# Patient Record
Sex: MALE | Race: WHITE | NOT HISPANIC OR LATINO
[De-identification: names, ages, dates, MRNs, and addresses within clinical notes are randomized per-mention and may not be internally consistent; named-entity substitution may affect disease eponyms.]

---

## 2017-02-02 ENCOUNTER — APPOINTMENT (OUTPATIENT)
Dept: VASCULAR SURGERY | Facility: CLINIC | Age: 57
End: 2017-02-02

## 2022-01-14 ENCOUNTER — TRANSCRIPTION ENCOUNTER (OUTPATIENT)
Age: 62
End: 2022-01-14

## 2022-01-30 ENCOUNTER — EMERGENCY (EMERGENCY)
Facility: HOSPITAL | Age: 62
LOS: 1 days | Discharge: HOME | End: 2022-01-30
Attending: EMERGENCY MEDICINE | Admitting: EMERGENCY MEDICINE
Payer: COMMERCIAL

## 2022-01-30 VITALS
HEART RATE: 83 BPM | OXYGEN SATURATION: 94 % | RESPIRATION RATE: 18 BRPM | HEIGHT: 72 IN | DIASTOLIC BLOOD PRESSURE: 73 MMHG | TEMPERATURE: 98 F | SYSTOLIC BLOOD PRESSURE: 177 MMHG

## 2022-01-30 VITALS
DIASTOLIC BLOOD PRESSURE: 68 MMHG | OXYGEN SATURATION: 96 % | HEART RATE: 86 BPM | SYSTOLIC BLOOD PRESSURE: 140 MMHG | RESPIRATION RATE: 18 BRPM

## 2022-01-30 DIAGNOSIS — I10 ESSENTIAL (PRIMARY) HYPERTENSION: Chronic | ICD-10-CM

## 2022-01-30 DIAGNOSIS — E78.5 HYPERLIPIDEMIA, UNSPECIFIED: Chronic | ICD-10-CM

## 2022-01-30 DIAGNOSIS — E10.9 TYPE 1 DIABETES MELLITUS WITHOUT COMPLICATIONS: Chronic | ICD-10-CM

## 2022-01-30 PROCEDURE — 99284 EMERGENCY DEPT VISIT MOD MDM: CPT | Mod: 25

## 2022-01-30 PROCEDURE — 30903 CONTROL OF NOSEBLEED: CPT

## 2022-01-30 NOTE — ED PROVIDER NOTE - PROGRESS NOTE DETAILS
ODALIS Purdy: Trialed afrin and holding pressure for 10 minutes, continues to bleed. ODALIS Purdy: Placed rhinorocket to left nare, will reassess. no active bleeding. ODALIS Purdy: Pt continues to have no active bleeding, will d/c home with ENT follow up. Pt will return with any worsening or concerning symptoms. amb sat 95RA no resp distress. walking in ED not dizzy or lightheaded. wife asking to go home. no active bleeding. dc home w ENT fu , po abx

## 2022-01-30 NOTE — ED PROVIDER NOTE - NSICDXFAMILYHX_GEN_ALL_CORE_FT
FAMILY HISTORY:  Father  Still living? No  Family history of early death, Age at diagnosis: Age Unknown    Mother  Still living? No  Family history of cancer, Age at diagnosis: Age Unknown

## 2022-01-30 NOTE — ED PROVIDER NOTE - OBJECTIVE STATEMENT
61yM w/pmhx DM2, HTN, HLD, AVR (June 2021) on Xarelto presenting with epistaxis. Pt was recently discharged from OSH after admission for covid on nasal cannula O2, states he finished steroids today. 61yM w/pmhx DM2, HTN, HLD, AVR (June 2021) on Xarelto presenting with epistaxis. Pt was recently discharged from OSH after admission for covid on nasal cannula O2, states he finished steroids today. States he sneezed this morning and developed a nose bleed. Pt reports his covid symptoms have significantly improved. Denies fever/chills, cp, sob, palpitations, dizziness, syncope, headache, abd pain, n/v/d, leg pain or swelling or any other concerns. 61yM w/pmhx DM2, HTN, HLD, AVR (June 2021) on Xarelto presenting with epistaxis. Pt was recently discharged from OSH after admission for covid on nasal cannula O2, states he finished steroids today. States he sneezed this morning and developed a nose bleed from left nare. Pt reports his covid symptoms have significantly improved. Denies fever/chills, cp, sob, palpitations, dizziness, syncope, headache, abd pain, n/v/d, leg pain or swelling or any other concerns.

## 2022-01-30 NOTE — ED ADULT NURSE NOTE - OBJECTIVE STATEMENT
patient arrives to room 24 A&Ox4, ambulatory. c/o nose bleed that began approx 1 hour ago and has not been able to stop it. patient is on eliquis at home. no acute distress noted. breathing is even and unlabored, pallor/diaphoresis not noted. patient is currently covid +, denies any shortness of breath, chest pain, nausea, vomiting or diarrhea. all safety maintained.

## 2022-01-30 NOTE — ED PROVIDER NOTE - CLINICAL SUMMARY MEDICAL DECISION MAKING FREE TEXT BOX
61yM w/pmhx DM2, HTN, HLD, AVR (June 2021) on Xarelto presenting with epistaxis. Pt was recently discharged from OSH after admission for covid on nasal cannula O2, states he finished steroids today. States he sneezed this morning and developed a nose bleed. On exam pt is well appearing, +left nare epistaxis, unable to locate origin of bleed. Will trial afrin/nasal pressure, if unsuccessful will place rhinorocket.

## 2022-01-30 NOTE — ED PROVIDER NOTE - NSICDXPASTMEDICALHX_GEN_ALL_CORE_FT
PAST MEDICAL HISTORY:  DM (diabetes mellitus)     HTN (hypertension)     Obesity     RICKEY on CPAP Pt is non-compliant with nocturnal CPAP

## 2022-01-30 NOTE — ED PROVIDER NOTE - PATIENT PORTAL LINK FT
You can access the FollowMyHealth Patient Portal offered by [facility] by registering at the following website: [email or web address]. By joining [facility]’s FollowMyHealth portal, you will also be able to view your health information using other applications (apps) compatible with our system.

## 2022-01-30 NOTE — ED PROVIDER NOTE - NSICDXPASTSURGICALHX_GEN_ALL_CORE_FT
0265
PAST SURGICAL HISTORY:  Benign essential HTN     Deviated septum     Diabetes mellitus type 1     HLD (hyperlipidemia)     Status Post LASIK Surgery Both eyes

## 2022-01-30 NOTE — ED PROVIDER NOTE - ATTENDING CONTRIBUTION TO CARE
Attending Statement: I have personally seen and examined this patient. I have fully participated in the care of this patient. I have reviewed all pertinent clinical information, including history physical exam, plan and the Resident's note and agree except as noted  61yoM pmhx DM, HTN, HLD, AVR on xarelto not eliquis, recent covid+ on home NC oxy pw epistaxis today.  Bleeding from left nare w clots. not dizzy or lightheaded. no cp or sob. no n/v   Vital signs noted. sitting up, no resp distress.  left nare bleeding w clots. obese male. able to ambulate in ED  plan used afrin in nose, continued to bleed, rhinorocket placed on left nare. Attending Statement: I have reviewed and agree with all pertinent clinical information, including history and physical exam and agree with treatment plan of the PA, except as noted.    61yoM pmhx DM, HTN, HLD, AVR on xarelto not eliquis, recent covid+ on home NC oxy pw epistaxis today.  Bleeding from left nare w clots. not dizzy or lightheaded. no cp or sob. no n/v   Vital signs noted. sitting up, no resp distress.  left nare bleeding w clots. obese male. able to ambulate in ED  plan used afrin in nose, continued to bleed, rhinorocket placed on left nare.

## 2022-01-30 NOTE — ED ADULT NURSE NOTE - NSIMPLEMENTINTERV_GEN_ALL_ED
Implemented All Fall with Harm Risk Interventions:  [place] to call system. Call bell, personal items and telephone within reach. Instruct patient to call for assistance. Room bathroom lighting operational. Non-slip footwear when patient is off stretcher. Physically safe environment: no spills, clutter or unnecessary equipment. Stretcher in lowest position, wheels locked, appropriate side rails in place. Provide visual cue, wrist band, yellow gown, etc. Monitor gait and stability. Monitor for mental status changes and reorient to person, place, and time. Review medications for side effects contributing to fall risk. Reinforce activity limits and safety measures with patient and family. Provide visual clues: red socks.

## 2022-01-30 NOTE — ED PROVIDER NOTE - NSFOLLOWUPINSTRUCTIONS_ED_ALL_ED_FT
Follow up with an ENT within 48 hours, you can call the clinic at [phone]  Take Augmentin 875mg (1 tablet) twice daily for 7 days  Lubricate the right nostril while use home oxygen  Return to the ER with any worsening or concerning symptoms, bleeding, dizziness, feeling faint, chest pain, shortness of breath or any other concerns. Follow up with an ENT within 48 hours, you can call the clinic at [phone]  Take Augmentin 875mg (1 tablet) twice daily for 7 days  Avoid blowing your nose or sneezing to prevent rupture of capillaries which can cause bleeding  Lubricate the right nostril while use home oxygen  May use nasal saline spray to keep membrane moist, use a humidifier at home. If nose start bleeding, hold firm pressure over nostril and apply cold pack  Return to the ER with any worsening or concerning symptoms, bleeding, dizziness, feeling faint, chest pain, shortness of breath or any other concerns.

## 2022-06-03 ENCOUNTER — OUTPATIENT (OUTPATIENT)
Dept: OUTPATIENT SERVICES | Facility: HOSPITAL | Age: 62
LOS: 1 days | End: 2022-06-03
Payer: MEDICARE

## 2022-06-03 ENCOUNTER — RESULT REVIEW (OUTPATIENT)
Age: 62
End: 2022-06-03

## 2022-06-03 ENCOUNTER — APPOINTMENT (OUTPATIENT)
Dept: WOUND CARE | Facility: CLINIC | Age: 62
End: 2022-06-03
Payer: MEDICARE

## 2022-06-03 ENCOUNTER — APPOINTMENT (OUTPATIENT)
Dept: RADIOLOGY | Facility: IMAGING CENTER | Age: 62
End: 2022-06-03
Payer: MEDICARE

## 2022-06-03 DIAGNOSIS — E10.9 TYPE 1 DIABETES MELLITUS WITHOUT COMPLICATIONS: Chronic | ICD-10-CM

## 2022-06-03 DIAGNOSIS — E78.5 HYPERLIPIDEMIA, UNSPECIFIED: Chronic | ICD-10-CM

## 2022-06-03 DIAGNOSIS — Z00.8 ENCOUNTER FOR OTHER GENERAL EXAMINATION: ICD-10-CM

## 2022-06-03 DIAGNOSIS — I10 ESSENTIAL (PRIMARY) HYPERTENSION: Chronic | ICD-10-CM

## 2022-06-03 PROCEDURE — 11042 DBRDMT SUBQ TIS 1ST 20SQCM/<: CPT

## 2022-06-03 PROCEDURE — 73630 X-RAY EXAM OF FOOT: CPT

## 2022-06-03 PROCEDURE — 73630 X-RAY EXAM OF FOOT: CPT | Mod: 26,RT

## 2022-06-03 PROCEDURE — 99203 OFFICE O/P NEW LOW 30 MIN: CPT | Mod: 25

## 2022-06-03 RX ORDER — COLLAGENASE SANTYL 250 [ARB'U]/G
250 OINTMENT TOPICAL DAILY
Qty: 1 | Refills: 3 | Status: ACTIVE | COMMUNITY
Start: 2022-06-03 | End: 1900-01-01

## 2022-06-03 NOTE — HISTORY OF PRESENT ILLNESS
[FreeTextEntry1] : Patient noticed bleeding end of February right hallux\par Treated by Dr. tolentino for ulcer right hallux\par patient using neosporen cream and bandaid\par non-healing right hallux ulcer with depth\par no change in healing\par no signs of cellulitis right hallux\par +DM with neuropathy\par patient golfing and walking excessively\par erythema and edema noted on legs not connected to right big toe ulcer\par venous insufficiency b/l\par DP 2/4 PT 1/4 both feet 1+ edema both legs\par no need for oral antibiosis\par unknown HGBA1C with YUO=644 mg/dl

## 2022-06-03 NOTE — PLAN
[FreeTextEntry1] : Podiatric evaluation and treaqtment full thickness debridement of ulceration right hallux\par rx santyl collagenase\par rx cam walker-diabetic off loading tridensity insert to off load right hallux ulcer\par no need for oral antibiosis\par rx x-rays 3 views right foot\par stress importance of staying off of right foot\par recommend good blood sugar control\par refer to vascular service for evaluation of venous insufficiency bilaterally

## 2022-06-10 ENCOUNTER — APPOINTMENT (OUTPATIENT)
Dept: WOUND CARE | Facility: CLINIC | Age: 62
End: 2022-06-10
Payer: MEDICARE

## 2022-06-10 VITALS
DIASTOLIC BLOOD PRESSURE: 77 MMHG | SYSTOLIC BLOOD PRESSURE: 150 MMHG | HEIGHT: 72 IN | WEIGHT: 315 LBS | HEART RATE: 71 BPM | BODY MASS INDEX: 42.66 KG/M2

## 2022-06-10 VITALS — TEMPERATURE: 97.9 F

## 2022-06-10 PROCEDURE — 11042 DBRDMT SUBQ TIS 1ST 20SQCM/<: CPT

## 2022-06-10 RX ORDER — MUPIROCIN 20 MG/G
2 OINTMENT TOPICAL
Qty: 1 | Refills: 2 | Status: ACTIVE | COMMUNITY
Start: 2022-06-10 | End: 1900-01-01

## 2022-06-10 RX ORDER — CEFADROXIL 500 MG/1
500 CAPSULE ORAL TWICE DAILY
Qty: 14 | Refills: 0 | Status: ACTIVE | COMMUNITY
Start: 2022-06-10 | End: 1900-01-01

## 2022-06-10 NOTE — PLAN
[FreeTextEntry1] : Podiatric evaluation and treaqtment full thickness debridement of ulceration right hallux\par rx santyl collagenase\par rx cam walker-diabetic off loading tridensity insert to off load right hallux ulcer\par no need for oral antibiosis\par  x-rays 3 views right foot no signs of fracture or osteomyelitis noted on x-ray report\par stress importance of staying off of right foot\par recommend good blood sugar control\par refer to vascular service for evaluation of venous insufficiency bilaterally\par patient going to vascular on Monday\par rx cefadroxil 500 mg 1 pill twice a day\par apply adaptic and mupirocin with dsd right shin\par called Geovanny Larsen about getting collagenase Rx filled\par rx mupirocin\par patient never got santyl collagenase\par return 1 week

## 2022-06-10 NOTE — HISTORY OF PRESENT ILLNESS
[FreeTextEntry1] : Patient noticed bleeding end of February right hallux\par Treated by Dr. tolentino for ulcer right hallux\par patient using neosporen cream and bandaid\par non-healing right hallux ulcer with depth\par no change in healing\par no signs of cellulitis right hallux\par +DM with neuropathy\par patient golfing and walking excessively\par erythema and edema noted on legs not connected to right big toe ulcer\par venous insufficiency b/l\par DP 2/4 PT 1/4 both feet 1+ edema both legs\par no need for oral antibiosis\par unknown HGBA1C with DRP=098 mg/dl\par new abrasion/ulcer anterior right shin from cam boot rubbing into leg\par slight erythema localized t o anterior right leg

## 2022-06-12 ENCOUNTER — NON-APPOINTMENT (OUTPATIENT)
Age: 62
End: 2022-06-12

## 2022-06-13 ENCOUNTER — APPOINTMENT (OUTPATIENT)
Dept: VASCULAR SURGERY | Facility: CLINIC | Age: 62
End: 2022-06-13
Payer: MEDICARE

## 2022-06-13 ENCOUNTER — NON-APPOINTMENT (OUTPATIENT)
Age: 62
End: 2022-06-13

## 2022-06-13 ENCOUNTER — TRANSCRIPTION ENCOUNTER (OUTPATIENT)
Age: 62
End: 2022-06-13

## 2022-06-13 VITALS
BODY MASS INDEX: 42.66 KG/M2 | DIASTOLIC BLOOD PRESSURE: 51 MMHG | HEIGHT: 72 IN | WEIGHT: 315 LBS | SYSTOLIC BLOOD PRESSURE: 155 MMHG | TEMPERATURE: 97.6 F | HEART RATE: 76 BPM

## 2022-06-13 PROCEDURE — 93923 UPR/LXTR ART STDY 3+ LVLS: CPT

## 2022-06-13 PROCEDURE — 93970 EXTREMITY STUDY: CPT

## 2022-06-13 PROCEDURE — 99203 OFFICE O/P NEW LOW 30 MIN: CPT

## 2022-06-13 NOTE — ASSESSMENT
[FreeTextEntry1] : In summary, Mr. Zamorano presents for evaluation for lower extremity arterial and venous insufficiency. HARRY/PVR were obtained and were within normal limits. A venous duplex showed no evidence of DVT/SVT/reflux in either extremity. I provided him a new prescription for knee high 20-30mmHg compression stockings and instructed him to wear this on his left leg. When his right shin ulcer heals, he can start to use compression stockings on bilateral legs. I recommend continued wound care per podiatry.

## 2022-06-13 NOTE — PHYSICAL EXAM
[de-identified] : On physical examination the patient is in no acute distress and neurologically intact. The lungs are clear to auscultation and the heart has a regular rate and rhythm. Abdomen is benign. Bilateral femoral and DP pulses are palpable. PT pulses are difficult to appreciate due to edema. There is hyperpigmentation of the skin of the ankles. There is a superficial 4x1.5cm ulceration of the anterior right shin. Right first toe plantar surface superficial ulcer. Severe right 1st toe edema.

## 2022-06-13 NOTE — HISTORY OF PRESENT ILLNESS
[FreeTextEntry1] : I have just had the pleasure of seeing Mr. Marvin Zamorano in consultation for lower extremity arterial and venous insufficiency. \par \par Mr. Zamorano is a kwame 62-year-old gentleman who presents with a right plantar surface first toe wound, which has been present since the end of March. It is being managed at the [facility] by Dr. Garcia. On 6/10/22, he was placed on antibiotics (Cefadroxil). Xrays did not show evidence of osteomyelitis but the toe is significantly edematous. Due to the toe wound, he was placed in a special boot that resulted in skin breakdown of the anterior aspect of the right leg. This developed on 6/9/22. \par \par He reports a history of prior ?RLE DVT following orthopedic surgery in ?2016 (at [facility]). He reports that he was placed on Xarelto at the time and this is continually prescribed by his Cardiologist at [facility]. He believes that the medication may also be for arrhythmia and aortic valve. He has not taken the medication in two weeks due to insurance issues and he reports that his cardiologist is aware of this.\par \par He has chronic leg edema, which is worse on the right side, skin pigmentation changes of the ankles, and a prior history of right leg ulcers and cellulitis. He wears compression stockings irregularly. He has not had any prior vascular surgical intervention on his legs. 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 He denies any symptoms of lower extremity claudication, rest pain, or tissue loss. \par \par He denies any history of CAD, MI, CVA, TIA, or CRI. \par \par His medical history is otherwise significant for DMII, HTN, GERD, obesity, bilateral KEYLA and left TKA, DJD, sciatica and RICKEY\par \par Medications: Aspirin, Norvasc, Lipitor, Pepcid, Cefadroxil, Amiodarone, Metoprolol, Trelegy, Insulin, Flomax\par \par Allergies: NDKA\par \par Social history: Non-smoker\par \par FH: NC\par

## 2022-06-17 ENCOUNTER — APPOINTMENT (OUTPATIENT)
Dept: WOUND CARE | Facility: CLINIC | Age: 62
End: 2022-06-17
Payer: MEDICARE

## 2022-06-17 ENCOUNTER — APPOINTMENT (OUTPATIENT)
Dept: WOUND CARE | Facility: CLINIC | Age: 62
End: 2022-06-17

## 2022-06-17 VITALS — TEMPERATURE: 97 F

## 2022-06-17 DIAGNOSIS — S81.801A UNSPECIFIED OPEN WOUND, RIGHT LOWER LEG, INITIAL ENCOUNTER: ICD-10-CM

## 2022-06-17 PROCEDURE — 99214 OFFICE O/P EST MOD 30 MIN: CPT | Mod: 25

## 2022-06-17 PROCEDURE — 11042 DBRDMT SUBQ TIS 1ST 20SQCM/<: CPT

## 2022-06-17 NOTE — PLAN
[FreeTextEntry1] : Podiatric evaluation and treaqtment full thickness debridement of ulceration right hallux\par no need for additional oral antibiosis\par  x-rays 3 views right foot no signs of fracture or osteomyelitis noted on x-ray report\par stress importance of staying off of right foot\par recommend good blood sugar control\par refer to vascular service for evaluation of venous insufficiency bilaterally\par Patient finsihed cefadroxil 500 mg \par apply adaptic and mupirocin with dsd right shin\par Patient using Mupirocin on toe\par refer to Dr. Gutiérrez for right leg wound\par recommend patient get low profile cam walker for right foot ulcer\par \par patient never got santyl collagenase\par return 2 week

## 2022-06-17 NOTE — HISTORY OF PRESENT ILLNESS
[FreeTextEntry1] : Patient noticed bleeding end of February right hallux\par Treated by Dr. tolentino for ulcer right hallux\par patient using neosporen cream and bandaid\par non-healing right hallux ulcer with depth\par no change in healing\par no signs of cellulitis right hallux\par +DM with neuropathy\par patient golfing and walking excessively\par erythema and edema noted on legs not connected to right big toe ulcer\par venous insufficiency b/l\par DP 2/4 PT 1/4 both feet 1+ edema both legs\par no need for oral antibiosis\par unknown HGBA1C with RYU=893 mg/dl\par new abrasion/ulcer anterior right shin from cam boot rubbing into leg\par slight erythema localized t o anterior right leg\par Patient not wearing cam walker right leg since it rubbed into right shin

## 2022-06-21 PROBLEM — S81.801A LEG WOUND, RIGHT, INITIAL ENCOUNTER: Status: ACTIVE | Noted: 2022-06-21

## 2022-06-21 NOTE — ASSESSMENT
[FreeTextEntry1] : Asked by podiatry to see patient, right anterior shin wound from CAM boot rubbing against area. Superficial opening, yellow moist slough over distal portion of wound, lifted with excisional debridement.

## 2022-06-21 NOTE — PLAN
[FreeTextEntry1] : 6/17/22\par Plan - wash wound soap and water, apply adaptic/aquacel/stephany\par foam adherent pad obtained from orthotist for inside of CAM walker, so when fastened it does not put pressure over area\par follow up 2 weeks\par supplies ordered

## 2022-06-21 NOTE — HISTORY OF PRESENT ILLNESS
[FreeTextEntry1] : Asked by podiatry to see patient, right anterior shin wound from CAM boot rubbing against area.\par Superficial opening, yellow moist slough over distal portion of wound, lifted with excisional debridement.

## 2022-06-21 NOTE — PHYSICAL EXAM
[2+] : left 2+ [Ankle Swelling (On Exam)] : present [Ankle Swelling Bilaterally] : bilaterally  [Ankle Swelling On The Left] : moderate [Skin Ulcer] : ulcer [Alert] : alert [Oriented to Person] : oriented to person [Oriented to Place] : oriented to place [Oriented to Time] : oriented to time [Calm] : calm [Please See PDF for Tissue Analytics] : Please See PDF for [employer]. [de-identified] : NAD, pleasant, well groomed

## 2022-07-01 ENCOUNTER — APPOINTMENT (OUTPATIENT)
Dept: WOUND CARE | Facility: CLINIC | Age: 62
End: 2022-07-01

## 2022-07-01 VITALS — TEMPERATURE: 98 F

## 2022-07-01 DIAGNOSIS — Z87.828 PERSONAL HISTORY OF OTHER (HEALED) PHYSICAL INJURY AND TRAUMA: ICD-10-CM

## 2022-07-01 DIAGNOSIS — S81.801A UNSPECIFIED OPEN WOUND, RIGHT LOWER LEG, INITIAL ENCOUNTER: ICD-10-CM

## 2022-07-01 PROCEDURE — 11042 DBRDMT SUBQ TIS 1ST 20SQCM/<: CPT

## 2022-07-01 PROCEDURE — 99213 OFFICE O/P EST LOW 20 MIN: CPT

## 2022-07-01 NOTE — PLAN
[FreeTextEntry1] : 6/17/22\par Plan - wash wound soap and water, apply adaptic/aquacel/stephany\par foam adherent pad obtained from orthotist for inside of CAM walker, so when fastened it does not put pressure over area\par follow up 2 weeks\par supplies ordered\par \par 7-1-22:\par Plan:\par f/u 6 months or prn

## 2022-07-01 NOTE — ASSESSMENT
[FreeTextEntry1] : Asked by podiatry to see patient, right anterior shin wound from CAM boot rubbing against area. Superficial opening, yellow moist slough over distal portion of wound, lifted with excisional debridement.\par \par 7-1-22:\par Pt here for f/u\par No new complaints\par Rt shin wound healed.  \par Rt toe wound as per DPM.  No other wounds present on BLE

## 2022-07-01 NOTE — PHYSICAL EXAM
[2+] : left 2+ [Ankle Swelling (On Exam)] : present [Ankle Swelling Bilaterally] : bilaterally  [Ankle Swelling On The Left] : moderate [Alert] : alert [Oriented to Person] : oriented to person [Oriented to Place] : oriented to place [Oriented to Time] : oriented to time [Calm] : calm [Please See PDF for Tissue Analytics] : Please See PDF for [employer]. [de-identified] : NAD, pleasant, well groomed [de-identified] : EOMI, NCAT [de-identified] : supple [de-identified] : equal chest rise [de-identified] : rt toe wound as per DPM

## 2022-07-01 NOTE — REVIEW OF SYSTEMS
[As Noted in HPI] : as noted in HPI [Skin Wound] : skin wound [Negative] : Psychiatric [de-identified] : rt toe wound as per DPM

## 2022-07-01 NOTE — HISTORY OF PRESENT ILLNESS
[FreeTextEntry1] : Patient noticed bleeding end of February right hallux\par Treated by Dr. tolentino for ulcer right hallux\par non-healing right hallux ulcer with depth\par no change in healing\par no signs of cellulitis right hallux\par +DM with neuropathy\par patient golfing and walking excessively\par erythema and edema noted on legs not connected to right big toe ulcer\par venous insufficiency b/l\par DP 2/4 PT 1/4 both feet 1+ edema both legs\par no need for oral antibiosis\par unknown HGBA1C with OYI=806 mg/dl\par new abrasion/ulcer anterior right shin from cam boot rubbing into leg\par no signs of erythema localized t o anterior right leg\par Patient returned to wearing cam walker right leg since right shin is healed

## 2022-07-01 NOTE — PLAN
[FreeTextEntry1] : Podiatric evaluation and treatment full thickness debridement of ulceration right hallux\par no need for additional oral antibiosis\par  x-rays 3 views right foot no signs of fracture or osteomyelitis noted on x-ray report\par stress importance of staying off of right foot\par recommend good blood sugar control\par refer to vascular service for evaluation of venous insufficiency bilaterally\par \par Patient using Mupirocin on toe\par recommend patient wear low profile cam walker for right foot ulcer\par Debride all mycotic dystrophic toenails both feet\par Rx Rocker bottom to right shoe\par Discuss IPJ fusion right hallux if wound doesn't heal\par return 2 week

## 2022-07-15 ENCOUNTER — APPOINTMENT (OUTPATIENT)
Dept: WOUND CARE | Facility: CLINIC | Age: 62
End: 2022-07-15

## 2022-07-15 PROCEDURE — 11042 DBRDMT SUBQ TIS 1ST 20SQCM/<: CPT

## 2022-07-15 NOTE — PLAN
[FreeTextEntry1] : Podiatric evaluation and treatment full thickness debridement of ulceration right hallux\par no need for additional oral antibiosis\par  x-rays 3 views right foot no signs of fracture or osteomyelitis noted on x-ray report\par stress importance of staying off of right foot\par recommend good blood sugar control\par refer to vascular service for evaluation of venous insufficiency bilaterally\par \par Patient using Mupirocin on toe\par recommend patient wear low profile cam walker for right foot ulcer\par Debride all mycotic dystrophic toenails both feet\par Rx Rocker bottom to right shoe- refer to mateo villanueva\par Discuss IPJ fusion right hallux if wound doesn't heal\par return 2 week

## 2022-07-29 ENCOUNTER — APPOINTMENT (OUTPATIENT)
Dept: WOUND CARE | Facility: CLINIC | Age: 62
End: 2022-07-29

## 2022-07-29 PROCEDURE — 11042 DBRDMT SUBQ TIS 1ST 20SQCM/<: CPT

## 2022-07-29 NOTE — HISTORY OF PRESENT ILLNESS
[FreeTextEntry1] : Patient noticed bleeding end of February right hallux\par Treated by Dr. tolentino for ulcer right hallux\par non-healing right hallux ulcer with depth\par no change in healing\par no signs of cellulitis right hallux\par +DM with neuropathy\par patient golfing and walking excessively\par erythema and edema noted on legs not connected to right big toe ulcer\par venous insufficiency b/l\par DP 2/4 PT 1/4 both feet 1+ edema both legs\par no need for oral antibiosis\par unknown HGBA1C with SOB=440 mg/dl\par new abrasion/ulcer anterior right shin from cam boot rubbing into leg\par no signs of erythema localized t o anterior right leg\par Patient admits not wearing cam walker right foot

## 2022-07-29 NOTE — PLAN
[FreeTextEntry1] : Podiatric evaluation and treatment full thickness debridement of ulceration right hallux\par no need for additional oral antibiosis\par  x-rays 3 views right foot no signs of fracture or osteomyelitis noted on x-ray report\par stress importance of staying off of right foot\par recommend good blood sugar control\par refer to vascular service for evaluation of venous insufficiency bilaterally\par \par Patient using Mupirocin on toe\par recommend patient wear low profile cam walker for right foot ulcer at all times when ambulating\par \par Rx Rocker bottom to right shoe- refer to mateo villanueva\par Discuss IPJ fusion right hallux if wound doesn't heal patient wants to hold off on surgery and will try to wear cam walker more often\par return 2 week

## 2022-08-12 ENCOUNTER — APPOINTMENT (OUTPATIENT)
Dept: WOUND CARE | Facility: CLINIC | Age: 62
End: 2022-08-12

## 2022-08-12 PROCEDURE — 11042 DBRDMT SUBQ TIS 1ST 20SQCM/<: CPT

## 2022-08-12 NOTE — PLAN
[FreeTextEntry1] : Podiatric evaluation and treatment full thickness debridement of ulceration right hallux\par no need for additional oral antibiosis\par  x-rays 3 views right foot no signs of fracture or osteomyelitis noted on x-ray report\par stress importance of staying off of right foot\par recommend good blood sugar control\par refer to vascular service for evaluation of venous insufficiency bilaterally\par \par Patient using Mupirocin on toe\par recommend patient wear low profile cam walker for right foot ulcer at all times when ambulating\par \par Rx Rocker bottom to right shoe- refer to mateo villanueva\par Discuss IPJ fusion right hallux if wound doesn't heal patient wants to hold off on surgery and will try to wear cam walker more often\par return 2 week\par \par

## 2022-08-12 NOTE — HISTORY OF PRESENT ILLNESS
[FreeTextEntry1] : Patient noticed bleeding end of February right hallux\par Treated by Dr. tolentino for ulcer right hallux\par non-healing right hallux ulcer with depth\par no change in healing\par no signs of cellulitis right hallux\par +DM with neuropathy\par patient golfing and walking excessively\par erythema and edema noted on legs not connected to right big toe ulcer\par venous insufficiency b/l\par DP 2/4 PT 1/4 both feet 1+ edema both legs\par no need for oral antibiosis\par unknown HGBA1C with TPA=569 mg/dl\par new abrasion/ulcer anterior right shin from cam boot rubbing into leg\par no signs of erythema localized t o anterior right leg\par Patient admits not wearing cam walker right foot\par

## 2022-08-12 NOTE — HISTORY OF PRESENT ILLNESS
[FreeTextEntry1] : Patient noticed bleeding end of February right hallux\par Treated by Dr. tolentino for ulcer right hallux\par non-healing right hallux ulcer with depth\par no change in healing\par no signs of cellulitis right hallux\par +DM with neuropathy\par patient golfing and walking excessively\par erythema and edema noted on legs not connected to right big toe ulcer\par venous insufficiency b/l\par DP 2/4 PT 1/4 both feet 1+ edema both legs\par no need for oral antibiosis\par unknown HGBA1C with EXP=169 mg/dl\par new abrasion/ulcer anterior right shin from cam boot rubbing into leg\par no signs of erythema localized t o anterior right leg\par Patient admits not wearing cam walker right foot\par

## 2022-09-02 ENCOUNTER — APPOINTMENT (OUTPATIENT)
Dept: WOUND CARE | Facility: CLINIC | Age: 62
End: 2022-09-02

## 2022-09-02 PROCEDURE — 11042 DBRDMT SUBQ TIS 1ST 20SQCM/<: CPT

## 2022-09-02 RX ORDER — MUPIROCIN 20 MG/G
2 OINTMENT TOPICAL
Qty: 1 | Refills: 2 | Status: ACTIVE | COMMUNITY
Start: 2022-09-02 | End: 1900-01-01

## 2022-09-02 NOTE — HISTORY OF PRESENT ILLNESS
[FreeTextEntry1] : Patient noticed bleeding end of February right hallux\par Treated by Dr. tolentino for ulcer right hallux\par right hallux ulcer with decreased depth\par improved appearance to wound\par no signs of cellulitis right hallux\par +DM with neuropathy\par patient golfing and walking excessively\par erythema and edema noted on legs not connected to right big toe ulcer\par venous insufficiency b/l\par DP 2/4 PT 1/4 both feet 1+ edema both legs\par no need for oral antibiosis\par unknown HGBA1C with UIT=969 mg/dl\par Patient admits not wearing cam walker right foot\par pATIENT ADMITS TO EXCESS AMBULATING AT [facility] AND DOING A LOT OF GARDENING AROUND THE HOUSE\par INCREASED SIZE AND DEPTH OF ULCER DISTAL RIGHT HALLUX

## 2022-09-02 NOTE — PLAN
[FreeTextEntry1] : Podiatric evaluation and treatment full thickness debridement of ulceration right hallux\par no need for additional oral antibiosis\par  x-rays 3 views right foot no signs of fracture or osteomyelitis noted on x-ray report\par stress importance of staying off of right foot\par recommend good blood sugar control\par refer to vascular service for evaluation of venous insufficiency bilaterally\par \par Patient using Mupirocin on toe\par recommend patient wear low profile cam walker for right foot ulcer at all times when ambulating\par full thickness debridement of ulcer with sterile #10 blade to the level of the subcutaneous tissue\par No signs of infection both feet\par Rx Rocker bottom to right shoe- refer to mateo villanueva patient not wearing rocker bottom boot\par Discuss IPJ fusion right hallux if wound doesn't heal patient wants to hold off on surgery and will try to wear cam walker more often\par patient is not staying off of right foot so surgical management fusion is not recommended at this time\par patient is at very high risk for amputation of right hallux since he has not been staying off of right foot and continues to ambulate without off loading shoe\par return 2 week

## 2022-09-16 ENCOUNTER — APPOINTMENT (OUTPATIENT)
Dept: WOUND CARE | Facility: CLINIC | Age: 62
End: 2022-09-16

## 2022-09-16 VITALS — TEMPERATURE: 97.9 F

## 2022-09-16 PROCEDURE — 11042 DBRDMT SUBQ TIS 1ST 20SQCM/<: CPT

## 2022-09-19 VITALS
SYSTOLIC BLOOD PRESSURE: 150 MMHG | BODY MASS INDEX: 42.66 KG/M2 | HEIGHT: 72 IN | DIASTOLIC BLOOD PRESSURE: 70 MMHG | WEIGHT: 315 LBS | HEART RATE: 77 BPM

## 2022-09-19 NOTE — HISTORY OF PRESENT ILLNESS
[FreeTextEntry1] : Patient noticed bleeding end of February right hallux\par Treated by Dr. tolentino for ulcer right hallux\par right hallux ulcer with decreased depth\par improved appearance to wound\par no signs of cellulitis right hallux\par +DM with neuropathy\par patient golfing and walking excessively\par erythema and edema noted on legs not connected to right big toe ulcer\par venous insufficiency b/l\par DP 2/4 PT 1/4 both feet 1+ edema both legs\par no need for oral antibiosis\par unknown HGBA1C with CKH=516 mg/dl\par Patient admits not wearing cam walker right foot\par DeCREASED SIZE AND DEPTH OF ULCER DISTAL RIGHT HALLUX

## 2022-09-30 ENCOUNTER — APPOINTMENT (OUTPATIENT)
Dept: WOUND CARE | Facility: CLINIC | Age: 62
End: 2022-09-30

## 2022-09-30 VITALS
DIASTOLIC BLOOD PRESSURE: 77 MMHG | TEMPERATURE: 98.1 F | WEIGHT: 315 LBS | HEART RATE: 66 BPM | RESPIRATION RATE: 18 BRPM | HEIGHT: 72 IN | SYSTOLIC BLOOD PRESSURE: 146 MMHG | BODY MASS INDEX: 42.66 KG/M2

## 2022-09-30 DIAGNOSIS — I87.2 VENOUS INSUFFICIENCY (CHRONIC) (PERIPHERAL): ICD-10-CM

## 2022-09-30 PROCEDURE — 11042 DBRDMT SUBQ TIS 1ST 20SQCM/<: CPT

## 2022-09-30 NOTE — HISTORY OF PRESENT ILLNESS
[FreeTextEntry1] : Patient noticed bleeding end of February right hallux\par Treated by Dr. tolentino for ulcer right hallux\par right hallux ulcer with decreased depth\par improved appearance to wound\par no signs of cellulitis right hallux\par +DM with neuropathy\par patient golfing and walking excessively\par erythema and edema noted on legs not connected to right big toe ulcer\par venous insufficiency b/l\par DP 2/4 PT 1/4 both feet 1+ edema both legs\par no need for oral antibiosis\par unknown HGBA1C with AHG=861 mg/dl\par Patient admits not wearing cam walker right foot\par DeCREASED SIZE AND DEPTH OF ULCER DISTAL RIGHT HALLUX with increased callus right hallux

## 2022-09-30 NOTE — PLAN
[FreeTextEntry1] : Podiatric evaluation and treatment full thickness debridement of ulceration right hallux down to the level of the subcutaneous tissue with sterile #10 blade\par no need for additional oral antibiosis\par  x-rays 3 views right foot no signs of fracture or osteomyelitis noted on x-ray report\par stress importance of staying off of right foot\par recommend good blood sugar control\par refer to vascular service for evaluation of venous insufficiency bilaterally\par \par Patient using Mupirocin on toe\par recommend patient always wear low profile cam walker for right foot ulcer at all times when ambulating\par full thickness debridement of ulcer with sterile #10 blade to the level of the subcutaneous tissue\par No signs of infection both feet\par Continue with Rocker bottom to right shoe- refer to mateo villanueva patient not wearing rocker bottom boot\par Discuss IPJ fusion right hallux if wound doesn't heal patient wants to hold off on surgery and will try to wear cam walker more often\par patient is not staying off of right foot so surgical management fusion is not recommended at this time\par patient is at very high risk for amputation of right hallux since he has not been staying off of right foot and continues to ambulate without off loading shoe\par return 2 week\par \par V/S: 150/70, HR: 77, Temp:98.1, Resp:18

## 2022-10-14 ENCOUNTER — APPOINTMENT (OUTPATIENT)
Dept: WOUND CARE | Facility: CLINIC | Age: 62
End: 2022-10-14

## 2022-10-14 VITALS — TEMPERATURE: 97.4 F

## 2022-10-14 VITALS
HEART RATE: 74 BPM | SYSTOLIC BLOOD PRESSURE: 142 MMHG | HEIGHT: 72 IN | DIASTOLIC BLOOD PRESSURE: 75 MMHG | BODY MASS INDEX: 42.66 KG/M2 | WEIGHT: 315 LBS

## 2022-10-14 DIAGNOSIS — M19.90 UNSPECIFIED OSTEOARTHRITIS, UNSPECIFIED SITE: ICD-10-CM

## 2022-10-14 PROCEDURE — 11042 DBRDMT SUBQ TIS 1ST 20SQCM/<: CPT

## 2022-10-14 NOTE — HISTORY OF PRESENT ILLNESS
[FreeTextEntry1] : Patient noticed bleeding end of February right hallux\par Treated by Dr. tolentino for ulcer right hallux\par right hallux ulcer with decreased depth\par improved appearance to wound\par no signs of cellulitis right hallux\par +DM with neuropathy\par patient golfing and walking excessively\par erythema and edema noted on legs not connected to right big toe ulcer\par venous insufficiency b/l\par DP 2/4 PT 1/4 both feet 1+ edema both legs\par no need for oral antibiosis\par unknown HGBA1C with UES=289 mg/dl\par Patient admits not wearing cam walker right foot\par DeCREASED SIZE AND DEPTH OF ULCER DISTAL RIGHT HALLUX with increased callus right hallux

## 2022-11-04 ENCOUNTER — APPOINTMENT (OUTPATIENT)
Dept: WOUND CARE | Facility: CLINIC | Age: 62
End: 2022-11-04

## 2022-11-04 VITALS — DIASTOLIC BLOOD PRESSURE: 76 MMHG | SYSTOLIC BLOOD PRESSURE: 175 MMHG | HEART RATE: 69 BPM

## 2022-11-04 VITALS — TEMPERATURE: 97.3 F | WEIGHT: 315 LBS | BODY MASS INDEX: 42.66 KG/M2 | HEIGHT: 72 IN

## 2022-11-04 PROCEDURE — 11042 DBRDMT SUBQ TIS 1ST 20SQCM/<: CPT

## 2022-11-04 NOTE — PLAN
[FreeTextEntry1] : Podiatric evaluation and treatment full thickness debridement of ulceration right hallux down to the level of the subcutaneous tissue with sterile #10 blade\par no need for additional oral antibiosis\par  x-rays 3 views right foot no signs of fracture or osteomyelitis noted on x-ray report\par stress importance of staying off of right foot\par recommend good blood sugar control\par refer to vascular service for evaluation of venous insufficiency bilaterally\par \par Patient using Mupirocin on toe\par recommend patient always wear low profile cam walker for right foot ulcer at all times when ambulating\par full thickness debridement of ulcer with sterile #10 blade to the level of the subcutaneous tissue\par No signs of infection both feet\par Continue with Rocker bottom to right shoe- refer to mateo villanueva patient not wearing rocker bottom boot\par Discuss IPJ fusion right hallux if wound doesn't heal patient wants to hold off on surgery and will try to wear cam walker more often\par patient is not staying off of right foot so surgical management fusion is not recommended at this time\par patient is at very high risk for amputation of right hallux since he has not been staying off of right foot and continues to ambulate without off loading shoe\par patient to see Dr. Faulkner for second opinion on IPJ fusion with ulcer\par mateo villanueva modified cam boot with tridensity insert\par await surgical evaluation\par return 2 week\par \par V/S: 150/70, HR: 77, Temp:98.1, Resp:18

## 2022-11-04 NOTE — HISTORY OF PRESENT ILLNESS
[FreeTextEntry1] : Patient noticed bleeding end of February right hallux\par Treated by Dr. tolentino for ulcer right hallux\par right hallux ulcer with decreased depth\par improved appearance to wound\par no signs of cellulitis right hallux\par +DM with neuropathy\par patient golfing and walking excessively\par erythema and edema noted on legs not connected to right big toe ulcer\par venous insufficiency b/l\par DP 2/4 PT 1/4 both feet 1+ edema both legs\par no need for oral antibiosis\par unknown HGBA1C with MVM=907 mg/dl\par Patient admits not wearing cam walker right foot\par DeCREASED SIZE AND DEPTH OF ULCER DISTAL RIGHT HALLUX with increased callus right hallux\par patient fell recently and banged big toe\par no signs of infection no signs of edema erythema-patient will get xrays next week

## 2022-11-14 ENCOUNTER — RESULT REVIEW (OUTPATIENT)
Age: 62
End: 2022-11-14

## 2022-11-14 ENCOUNTER — APPOINTMENT (OUTPATIENT)
Dept: MRI IMAGING | Facility: IMAGING CENTER | Age: 62
End: 2022-11-14

## 2022-11-14 PROCEDURE — 73720 MRI LWR EXTREMITY W/O&W/DYE: CPT | Mod: 26,RT,MH

## 2022-11-18 ENCOUNTER — APPOINTMENT (OUTPATIENT)
Dept: WOUND CARE | Facility: CLINIC | Age: 62
End: 2022-11-18

## 2022-11-18 VITALS
HEIGHT: 72 IN | TEMPERATURE: 98.4 F | DIASTOLIC BLOOD PRESSURE: 79 MMHG | BODY MASS INDEX: 42.66 KG/M2 | SYSTOLIC BLOOD PRESSURE: 169 MMHG | HEART RATE: 84 BPM | WEIGHT: 315 LBS

## 2022-11-18 PROCEDURE — 11042 DBRDMT SUBQ TIS 1ST 20SQCM/<: CPT

## 2022-11-18 NOTE — HISTORY OF PRESENT ILLNESS
[FreeTextEntry1] : Patient noticed bleeding end of February right hallux\par Treated by Dr. tolentino for ulcer right hallux\par right hallux ulcer with decreased depth\par improved appearance to wound\par no signs of cellulitis right hallux\par +DM with neuropathy\par patient golfing and walking excessively\par erythema and edema noted on legs not connected to right big toe ulcer\par venous insufficiency b/l\par DP 2/4 PT 1/4 both feet 1+ edema both legs\par no need for oral antibiosis\par unknown HGBA1C with NBY=897 mg/dl\par Patient admits not wearing cam walker right foot\par DeCREASED SIZE AND DEPTH OF ULCER DISTAL RIGHT HALLUX with increased callus right hallux\par patient fell recently and banged big toe\par no signs of infection no signs of edema erythema\par MRi revealed no signs of acute OM but high probability of progression to acute OM if left untreated

## 2022-11-18 NOTE — PLAN
[FreeTextEntry1] : Podiatric evaluation and treatment full thickness debridement of ulceration right hallux down to the level of the subcutaneous tissue with sterile #10 blade\par no need for additional oral antibiosis\par  x-rays 3 views right foot no signs of fracture or osteomyelitis noted on x-ray report\par stress importance of staying off of right foot\par recommend good blood sugar control\par refer to vascular service for evaluation of venous insufficiency bilaterally\par \par Patient using Mupirocin on toe\par recommend patient always wear low profile cam walker for right foot ulcer at all times when ambulating\par full thickness debridement of ulcer with sterile #10 blade to the level of the subcutaneous tissue\par No signs of infection both feet\par Continue with Rocker bottom to right shoe- refer to mateo villanueva patient not wearing rocker bottom boot\par Discuss IPJ fusion right hallux if wound doesn't heal patient wants to hold off on surgery and will try to wear cam walker more often\par patient is not staying off of right foot so surgical management fusion is not recommended at this time\par patient is at very high risk for amputation of right hallux since he has not been staying off of right foot and continues to ambulate without off loading shoe\par patient to see Dr. Faulkner for scheduling of IPJ fusion with ulcer next Wednesday\par mateo villanueva modified cam boot with tridensity insert\par await surgical evaluation\par return 2 week or PRN\par \par V/S: 150/70, HR: 77, Temp:98.1, Resp:18

## 2022-11-25 ENCOUNTER — OUTPATIENT (OUTPATIENT)
Dept: OUTPATIENT SERVICES | Facility: HOSPITAL | Age: 62
LOS: 1 days | End: 2022-11-25
Payer: MEDICARE

## 2022-11-25 VITALS
HEART RATE: 66 BPM | OXYGEN SATURATION: 97 % | SYSTOLIC BLOOD PRESSURE: 110 MMHG | RESPIRATION RATE: 20 BRPM | WEIGHT: 315 LBS | DIASTOLIC BLOOD PRESSURE: 71 MMHG | HEIGHT: 72 IN | TEMPERATURE: 98 F

## 2022-11-25 DIAGNOSIS — G47.33 OBSTRUCTIVE SLEEP APNEA (ADULT) (PEDIATRIC): ICD-10-CM

## 2022-11-25 DIAGNOSIS — M20.41 OTHER HAMMER TOE(S) (ACQUIRED), RIGHT FOOT: ICD-10-CM

## 2022-11-25 DIAGNOSIS — Z98.890 OTHER SPECIFIED POSTPROCEDURAL STATES: Chronic | ICD-10-CM

## 2022-11-25 DIAGNOSIS — Z96.659 PRESENCE OF UNSPECIFIED ARTIFICIAL KNEE JOINT: Chronic | ICD-10-CM

## 2022-11-25 DIAGNOSIS — E11.9 TYPE 2 DIABETES MELLITUS WITHOUT COMPLICATIONS: ICD-10-CM

## 2022-11-25 DIAGNOSIS — E78.5 HYPERLIPIDEMIA, UNSPECIFIED: Chronic | ICD-10-CM

## 2022-11-25 DIAGNOSIS — I10 ESSENTIAL (PRIMARY) HYPERTENSION: Chronic | ICD-10-CM

## 2022-11-25 DIAGNOSIS — Z95.1 PRESENCE OF AORTOCORONARY BYPASS GRAFT: Chronic | ICD-10-CM

## 2022-11-25 DIAGNOSIS — K21.9 GASTRO-ESOPHAGEAL REFLUX DISEASE WITHOUT ESOPHAGITIS: Chronic | ICD-10-CM

## 2022-11-25 DIAGNOSIS — J34.2 DEVIATED NASAL SEPTUM: Chronic | ICD-10-CM

## 2022-11-25 DIAGNOSIS — E10.9 TYPE 1 DIABETES MELLITUS WITHOUT COMPLICATIONS: Chronic | ICD-10-CM

## 2022-11-25 DIAGNOSIS — I82.409 ACUTE EMBOLISM AND THROMBOSIS OF UNSPECIFIED DEEP VEINS OF UNSPECIFIED LOWER EXTREMITY: ICD-10-CM

## 2022-11-25 LAB
A1C WITH ESTIMATED AVERAGE GLUCOSE RESULT: 7.4 % — HIGH (ref 4–5.6)
ANION GAP SERPL CALC-SCNC: 15 MMOL/L — SIGNIFICANT CHANGE UP (ref 5–17)
BUN SERPL-MCNC: 18 MG/DL — SIGNIFICANT CHANGE UP (ref 7–23)
CALCIUM SERPL-MCNC: 9.5 MG/DL — SIGNIFICANT CHANGE UP (ref 8.4–10.5)
CHLORIDE SERPL-SCNC: 105 MMOL/L — SIGNIFICANT CHANGE UP (ref 96–108)
CO2 SERPL-SCNC: 24 MMOL/L — SIGNIFICANT CHANGE UP (ref 22–31)
CREAT SERPL-MCNC: 1.03 MG/DL — SIGNIFICANT CHANGE UP (ref 0.5–1.3)
EGFR: 81 ML/MIN/1.73M2 — SIGNIFICANT CHANGE UP
ESTIMATED AVERAGE GLUCOSE: 166 MG/DL — HIGH (ref 68–114)
GLUCOSE SERPL-MCNC: 261 MG/DL — HIGH (ref 70–99)
HCT VFR BLD CALC: 45.2 % — SIGNIFICANT CHANGE UP (ref 39–50)
HGB BLD-MCNC: 14.1 G/DL — SIGNIFICANT CHANGE UP (ref 13–17)
MCHC RBC-ENTMCNC: 27.3 PG — SIGNIFICANT CHANGE UP (ref 27–34)
MCHC RBC-ENTMCNC: 31.2 GM/DL — LOW (ref 32–36)
MCV RBC AUTO: 87.4 FL — SIGNIFICANT CHANGE UP (ref 80–100)
NRBC # BLD: 0 /100 WBCS — SIGNIFICANT CHANGE UP (ref 0–0)
PLATELET # BLD AUTO: 254 K/UL — SIGNIFICANT CHANGE UP (ref 150–400)
POTASSIUM SERPL-MCNC: 4.7 MMOL/L — SIGNIFICANT CHANGE UP (ref 3.5–5.3)
POTASSIUM SERPL-SCNC: 4.7 MMOL/L — SIGNIFICANT CHANGE UP (ref 3.5–5.3)
RBC # BLD: 5.17 M/UL — SIGNIFICANT CHANGE UP (ref 4.2–5.8)
RBC # FLD: 14.8 % — HIGH (ref 10.3–14.5)
SARS-COV-2 RNA SPEC QL NAA+PROBE: SIGNIFICANT CHANGE UP
SODIUM SERPL-SCNC: 144 MMOL/L — SIGNIFICANT CHANGE UP (ref 135–145)
WBC # BLD: 10.71 K/UL — HIGH (ref 3.8–10.5)
WBC # FLD AUTO: 10.71 K/UL — HIGH (ref 3.8–10.5)

## 2022-11-25 RX ORDER — CHLORHEXIDINE GLUCONATE 213 G/1000ML
1 SOLUTION TOPICAL ONCE
Refills: 0 | Status: DISCONTINUED | OUTPATIENT
Start: 2022-11-28 | End: 2022-11-28

## 2022-11-25 RX ORDER — CEFAZOLIN SODIUM 1 G
3000 VIAL (EA) INJECTION ONCE
Refills: 0 | Status: COMPLETED | OUTPATIENT
Start: 2022-11-28 | End: 2022-11-28

## 2022-11-25 RX ORDER — LIDOCAINE HCL 20 MG/ML
0.2 VIAL (ML) INJECTION ONCE
Refills: 0 | Status: DISCONTINUED | OUTPATIENT
Start: 2022-11-28 | End: 2022-11-28

## 2022-11-25 RX ORDER — SODIUM CHLORIDE 9 MG/ML
3 INJECTION INTRAMUSCULAR; INTRAVENOUS; SUBCUTANEOUS EVERY 8 HOURS
Refills: 0 | Status: DISCONTINUED | OUTPATIENT
Start: 2022-11-28 | End: 2022-11-28

## 2022-11-25 NOTE — H&P PST ADULT - NSICDXPASTSURGICALHX_GEN_ALL_CORE_FT
PAST SURGICAL HISTORY:  Benign essential HTN     Deviated nasal septum repair    Deviated septum     Diabetes mellitus type 1     GERD (gastroesophageal reflux disease)     History of arthroplasty of left hip     History of arthroplasty of right hip     History of endoscopy     HLD (hyperlipidemia)     S/P CABG (coronary artery bypass graft) aortic valve replacement x 2 years ago    S/P LASIK surgery 15 years ago    S/P total knee arthroplasty left knee 2016    Status Post LASIK Surgery Both eyes

## 2022-11-25 NOTE — H&P PST ADULT - HISTORY OF PRESENT ILLNESS
62 year old male presents for preop testing for scheduled right hallux interphalangeal joint fusion for hammer toe on 11/28/2022. His medical history significant for AVR 6/4/2022, HTN, morbid obesity, RICKEY (CPAP, T2DM, dyslipidemia, arthritis, GERD 62 year old male presents for preop testing for scheduled right hallux interphalangeal joint fusion for hammer toe on 11/28/2022. His medical history significant for AVR 6/4/2022, HTN, DVT 2016 post knee replacement, morbid obesity, RICKEY (CPAP, T2DM, dyslipidemia, arthritis, GERD, covid-19 infection 1/2021 (hospitalized, no intubation, received antibody infusion), occasional peripheral edema.  Pt denies any covid-19 infection, PCR test done at [facility] 62 year old male presents for preop testing for scheduled right hallux interphalangeal joint fusion for hammer toe on 11/28/2022. His medical history significant for AVR 6/4/2022, HTN, DVT 2016 post knee replacement (on xarelto), morbidly obese, RICKEY (CPAP, T2DM, dyslipidemia, arthritis, GERD, covid-19 infection 1/2021 (hospitalized, no intubation, received antibody infusion), occasional peripheral edema.  Pt denies any covid-19 infection, PCR test done at [facility] 62 year old male presents for preop testing for scheduled right hallux interphalangeal joint fusion for hammer toe on 11/28/2022. His medical history significant for AVR -bioprosthetic 6/4/2022, HTN, DVT 2016 post knee replacement (on xarelto), morbidly obese, RICKEY (CPAP, T2DM, dyslipidemia, arthritis, GERD, covid-19 infection 1/2021 (hospitalized, no intubation, received antibody infusion), occasional peripheral edema.  Pt denies any covid-19 infection, PCR test done at [facility]

## 2022-11-25 NOTE — H&P PST ADULT - NSICDXPASTMEDICALHX_GEN_ALL_CORE_FT
PAST MEDICAL HISTORY:  2019 novel coronavirus disease (COVID-19) 1/2021 hospitalized  no intubation   received antibody infusion   was on oxygen 2 liters at that time    DM (diabetes mellitus) T2DM  A1C 7.2 November 2022    DVT, lower extremity post left knee replacement in 2016    Dyslipidemia     History of arthritis     History of edema     HTN (hypertension)     Obesity     RICKEY on CPAP Pt is non-compliant with nocturnal CPAP    Renal calculus in the past

## 2022-11-25 NOTE — H&P PST ADULT - PROBLEM SELECTOR PLAN 4
pt was evaluated by his cardiologist and advised to hold xarelto 3 days prior to surgery last dose was 11/24/2022 at night  pt has a h/o DVT in 2016 post knee replacement

## 2022-11-25 NOTE — H&P PST ADULT - PROBLEM SELECTOR PLAN 2
fs on admit  no diabetic medication on DOS  semglee to decrease 20 % night prior to surgery per protocol

## 2022-11-25 NOTE — H&P PST ADULT - VENOUS THROMBOEMBOLISM AGE
[Fever] : fever [Night Sweats] : night sweats [Recent ___ Lb Weight Gain] : recent [unfilled] ~Ulb weight gain [Earache] : earache [Tinnitus] : tinnitus [Snoring] : snoring [Constipation] : constipation [Dizziness] : dizziness [Sleep Disturbances] : sleep disturbances [Negative] : Heme/Lymph (2) age 61-74 years

## 2022-11-25 NOTE — H&P PST ADULT - NS MD HP INPLANTS MED DEV
tissue aortic valve model [id number] gretel 4,2021 tissue aortic valve model [id number] june 4,2021 bioprosthetic aortic valve model [id number] june 4,2021

## 2022-11-25 NOTE — H&P PST ADULT - PROBLEM SELECTOR PLAN 1
Scheduled for right hallux interphalangeal joint fusion   pt provided with both verbal and written preop instruction, verbalized understanding and teach back

## 2022-11-27 ENCOUNTER — TRANSCRIPTION ENCOUNTER (OUTPATIENT)
Age: 62
End: 2022-11-27

## 2022-11-28 ENCOUNTER — OUTPATIENT (OUTPATIENT)
Dept: INPATIENT UNIT | Facility: HOSPITAL | Age: 62
LOS: 1 days | End: 2022-11-28
Payer: MEDICARE

## 2022-11-28 ENCOUNTER — TRANSCRIPTION ENCOUNTER (OUTPATIENT)
Age: 62
End: 2022-11-28

## 2022-11-28 ENCOUNTER — RESULT REVIEW (OUTPATIENT)
Age: 62
End: 2022-11-28

## 2022-11-28 VITALS
SYSTOLIC BLOOD PRESSURE: 141 MMHG | RESPIRATION RATE: 18 BRPM | HEART RATE: 57 BPM | OXYGEN SATURATION: 99 % | DIASTOLIC BLOOD PRESSURE: 71 MMHG | TEMPERATURE: 97 F

## 2022-11-28 VITALS
HEART RATE: 67 BPM | TEMPERATURE: 98 F | SYSTOLIC BLOOD PRESSURE: 133 MMHG | DIASTOLIC BLOOD PRESSURE: 77 MMHG | HEIGHT: 72 IN | OXYGEN SATURATION: 97 % | WEIGHT: 315 LBS | RESPIRATION RATE: 18 BRPM

## 2022-11-28 DIAGNOSIS — Z95.1 PRESENCE OF AORTOCORONARY BYPASS GRAFT: Chronic | ICD-10-CM

## 2022-11-28 DIAGNOSIS — I10 ESSENTIAL (PRIMARY) HYPERTENSION: Chronic | ICD-10-CM

## 2022-11-28 DIAGNOSIS — Z98.890 OTHER SPECIFIED POSTPROCEDURAL STATES: Chronic | ICD-10-CM

## 2022-11-28 DIAGNOSIS — M20.41 OTHER HAMMER TOE(S) (ACQUIRED), RIGHT FOOT: ICD-10-CM

## 2022-11-28 DIAGNOSIS — J34.2 DEVIATED NASAL SEPTUM: Chronic | ICD-10-CM

## 2022-11-28 DIAGNOSIS — E78.5 HYPERLIPIDEMIA, UNSPECIFIED: Chronic | ICD-10-CM

## 2022-11-28 DIAGNOSIS — K21.9 GASTRO-ESOPHAGEAL REFLUX DISEASE WITHOUT ESOPHAGITIS: Chronic | ICD-10-CM

## 2022-11-28 DIAGNOSIS — Z96.659 PRESENCE OF UNSPECIFIED ARTIFICIAL KNEE JOINT: Chronic | ICD-10-CM

## 2022-11-28 DIAGNOSIS — E10.9 TYPE 1 DIABETES MELLITUS WITHOUT COMPLICATIONS: Chronic | ICD-10-CM

## 2022-11-28 LAB
GLUCOSE BLDC GLUCOMTR-MCNC: 107 MG/DL — HIGH (ref 70–99)
GLUCOSE BLDC GLUCOMTR-MCNC: 88 MG/DL — SIGNIFICANT CHANGE UP (ref 70–99)
GLUCOSE BLDC GLUCOMTR-MCNC: 96 MG/DL — SIGNIFICANT CHANGE UP (ref 70–99)

## 2022-11-28 PROCEDURE — 73630 X-RAY EXAM OF FOOT: CPT

## 2022-11-28 PROCEDURE — 76000 FLUOROSCOPY <1 HR PHYS/QHP: CPT

## 2022-11-28 PROCEDURE — 82962 GLUCOSE BLOOD TEST: CPT

## 2022-11-28 PROCEDURE — U0003: CPT

## 2022-11-28 PROCEDURE — 36415 COLL VENOUS BLD VENIPUNCTURE: CPT

## 2022-11-28 PROCEDURE — 73630 X-RAY EXAM OF FOOT: CPT | Mod: 26,RT

## 2022-11-28 PROCEDURE — G0463: CPT

## 2022-11-28 PROCEDURE — U0005: CPT

## 2022-11-28 PROCEDURE — 80048 BASIC METABOLIC PNL TOTAL CA: CPT

## 2022-11-28 PROCEDURE — 85027 COMPLETE CBC AUTOMATED: CPT

## 2022-11-28 PROCEDURE — 28285 REPAIR OF HAMMERTOE: CPT | Mod: T5

## 2022-11-28 PROCEDURE — C1889: CPT

## 2022-11-28 PROCEDURE — 83036 HEMOGLOBIN GLYCOSYLATED A1C: CPT

## 2022-11-28 DEVICE — IMPLANTABLE DEVICE: Type: IMPLANTABLE DEVICE | Site: RIGHT | Status: FUNCTIONAL

## 2022-11-28 RX ORDER — INSULIN LISPRO 100/ML
0 VIAL (ML) SUBCUTANEOUS
Qty: 0 | Refills: 0 | DISCHARGE

## 2022-11-28 RX ORDER — ONDANSETRON 8 MG/1
4 TABLET, FILM COATED ORAL ONCE
Refills: 0 | Status: DISCONTINUED | OUTPATIENT
Start: 2022-11-28 | End: 2022-11-29

## 2022-11-28 RX ORDER — HYDROMORPHONE HYDROCHLORIDE 2 MG/ML
0.5 INJECTION INTRAMUSCULAR; INTRAVENOUS; SUBCUTANEOUS
Refills: 0 | Status: DISCONTINUED | OUTPATIENT
Start: 2022-11-28 | End: 2022-11-29

## 2022-11-28 RX ORDER — AMLODIPINE BESYLATE 2.5 MG/1
1 TABLET ORAL
Qty: 0 | Refills: 0 | DISCHARGE

## 2022-11-28 RX ORDER — DULAGLUTIDE 4.5 MG/.5ML
0 INJECTION, SOLUTION SUBCUTANEOUS
Qty: 0 | Refills: 0 | DISCHARGE

## 2022-11-28 RX ORDER — SODIUM CHLORIDE 9 MG/ML
1000 INJECTION, SOLUTION INTRAVENOUS
Refills: 0 | Status: DISCONTINUED | OUTPATIENT
Start: 2022-11-28 | End: 2022-11-29

## 2022-11-28 RX ORDER — ASPIRIN/CALCIUM CARB/MAGNESIUM 324 MG
1 TABLET ORAL
Qty: 0 | Refills: 0 | DISCHARGE

## 2022-11-28 RX ORDER — INSULIN GLARGINE 100 [IU]/ML
0 INJECTION, SOLUTION SUBCUTANEOUS
Qty: 0 | Refills: 0 | DISCHARGE

## 2022-11-28 RX ORDER — DEXTROSE 50 % IN WATER 50 %
12.5 SYRINGE (ML) INTRAVENOUS ONCE
Refills: 0 | Status: COMPLETED | OUTPATIENT
Start: 2022-11-28 | End: 2022-11-28

## 2022-11-28 RX ORDER — RIVAROXABAN 15 MG-20MG
0 KIT ORAL
Qty: 0 | Refills: 0 | DISCHARGE

## 2022-11-28 RX ORDER — TAMSULOSIN HYDROCHLORIDE 0.4 MG/1
1 CAPSULE ORAL
Qty: 0 | Refills: 0 | DISCHARGE

## 2022-11-28 RX ORDER — AMIODARONE HYDROCHLORIDE 400 MG/1
1 TABLET ORAL
Qty: 0 | Refills: 0 | DISCHARGE

## 2022-11-28 RX ORDER — ACETAMINOPHEN 500 MG
2 TABLET ORAL
Qty: 0 | Refills: 0 | DISCHARGE

## 2022-11-28 RX ORDER — MILK THISTLE 150 MG
1 CAPSULE ORAL
Qty: 0 | Refills: 0 | DISCHARGE

## 2022-11-28 RX ORDER — ATORVASTATIN CALCIUM 80 MG/1
1 TABLET, FILM COATED ORAL
Qty: 0 | Refills: 0 | DISCHARGE

## 2022-11-28 RX ORDER — METOPROLOL TARTRATE 50 MG
1 TABLET ORAL
Qty: 0 | Refills: 0 | DISCHARGE

## 2022-11-28 RX ORDER — FAMOTIDINE 10 MG/ML
1 INJECTION INTRAVENOUS
Qty: 0 | Refills: 0 | DISCHARGE

## 2022-11-28 RX ADMIN — Medication 12.5 MILLILITER(S): at 15:18

## 2022-11-28 NOTE — ASU PATIENT PROFILE, ADULT - FALL HARM RISK - UNIVERSAL INTERVENTIONS
Bed in lowest position, wheels locked, appropriate side rails in place/Call bell, personal items and telephone in reach/Instruct patient to call for assistance before getting out of bed or chair/Non-slip footwear when patient is out of bed/[place] to call system/Physically safe environment - no spills, clutter or unnecessary equipment/Purposeful Proactive Rounding/Room/bathroom lighting operational, light cord in reach

## 2022-11-28 NOTE — PROVIDER CONTACT NOTE (CHANGE IN STATUS NOTIFICATION) - ACTION/TREATMENT ORDERED:
50% dextrose ivp given (12.5 mg) recheck sugar in 30 minutes, FS 96 on recheck patient doing well asymptomatic

## 2022-11-28 NOTE — ASU DISCHARGE PLAN (ADULT/PEDIATRIC) - NS MD DC FALL RISK RISK
For information on Fall & Injury Prevention, visit: [email or web address] OR  [email or web address] OR  https://www.cdc.gov/steadi/patient.html

## 2022-11-28 NOTE — ASU DISCHARGE PLAN (ADULT/PEDIATRIC) - CALL YOUR DOCTOR IF YOU HAVE ANY OF THE FOLLOWING:
100.4/Fever greater than (need to indicate Fahrenheit or Celsius)/Wound/Surgical Site with redness, or foul smelling discharge or pus

## 2022-11-28 NOTE — PROVIDER CONTACT NOTE (CHANGE IN STATUS NOTIFICATION) - BACKGROUND
covid 19 infection, Y2DM, DVT, left TKR, HLD, arthritis, HTN, obesity, edema, RICKEY (CPAP), deviated nasal repair, GERD, arthroscopic right hip, right THR

## 2022-11-28 NOTE — PROVIDER CONTACT NOTE (CHANGE IN STATUS NOTIFICATION) - SITUATION
patient arrived in [facility] with BGL of 88, patient took standing doses of insulin this am and last night, patient asymptomatic, as per patient that is a low level for him.

## 2022-12-02 ENCOUNTER — APPOINTMENT (OUTPATIENT)
Dept: WOUND CARE | Facility: CLINIC | Age: 62
End: 2022-12-02
Payer: COMMERCIAL

## 2022-12-02 VITALS
HEART RATE: 61 BPM | DIASTOLIC BLOOD PRESSURE: 82 MMHG | SYSTOLIC BLOOD PRESSURE: 146 MMHG | TEMPERATURE: 98.6 F | RESPIRATION RATE: 18 BRPM

## 2022-12-02 DIAGNOSIS — L97.512 NON-PRESSURE CHRONIC ULCER OF OTHER PART OF RIGHT FOOT WITH FAT LAYER EXPOSED: ICD-10-CM

## 2022-12-02 DIAGNOSIS — E11.9 TYPE 2 DIABETES MELLITUS W/OUT COMPLICATIONS: ICD-10-CM

## 2022-12-02 PROCEDURE — 99024 POSTOP FOLLOW-UP VISIT: CPT

## 2022-12-02 NOTE — HISTORY OF PRESENT ILLNESS
[FreeTextEntry1] : Patient noticed bleeding end of February right hallux\par Treated by Dr. tolentino for ulcer right hallux\par right hallux ulcer with decreased depth\par improved appearance to wound\par no signs of cellulitis right hallux\par +DM with neuropathy\par patient golfing and walking excessively\par erythema and edema noted on legs not connected to right big toe ulcer\par venous insufficiency b/l\par DP 2/4 PT 1/4 both feet 1+ edema both legs\par no need for oral antibiosis\par unknown HGBA1C with HJO=863 mg/dl\par Patient admits not wearing cam walker right foot\par DeCREASED SIZE AND DEPTH OF ULCER DISTAL RIGHT HALLUX with increased callus right hallux\par patient fell recently and banged big toe\par no signs of infection no signs of edema erythema\par MRi revealed no signs of acute OM but high probability of progression to acute OM if left untreated\par Patient is s/p Hallux IPJ fusiopn with staples x2 right foot by Dr. Faulkner. patient tolerated surgery and anesthesia well without any known complications\par Rectus Hallux IPJ with 1+ edema \par healing ulcer distal right hallux\par no drainage no signs of infection no erythema

## 2022-12-02 NOTE — PLAN
[FreeTextEntry1] : Podiatric evaluation and treatment \par no debridement of ulcer performed\par no need for additional oral antibiosis\par  x-rays 3 views right foot no signs of fracture or osteomyelitis noted on x-ray report\par stress importance of staying off of right foot\par recommend good blood sugar control\par refer to vascular service for evaluation of venous insufficiency bilaterally\par Apply adaptic gauze and stephany with ace bandage right foot\par recommend patient continue with open toe surgical shoe right foot but stress importance of minimal ambulating\par No signs of infection both feet\par Continue with Rocker bottom to right shoe- refer to mateo villanueva patient not wearing rocker bottom boot\par patient not wearing cam walker as instructed\par patient is not staying off of right foot \par patient is at very high risk for amputation of right hallux since he has not been staying off of right foot and continues to ambulate without off loading shoe\par Plus possibility of osteomyelitis right foot\par patient to see Dr. Faulkner for follow up of IPJ fusion with ulcer next Week\par /70, HR: 77, Temp:98.1, Resp:18\par podiatry to follow at out patient office next week\par patient very pleased with clinical appearance to right big toe and healing ulcer right hallux

## 2023-01-06 ENCOUNTER — APPOINTMENT (OUTPATIENT)
Dept: WOUND CARE | Facility: CLINIC | Age: 63
End: 2023-01-06

## 2024-01-09 NOTE — HISTORY OF PRESENT ILLNESS
[FreeTextEntry1] : Patient noticed bleeding end of February right hallux\par Treated by Dr. tolentino for ulcer right hallux\par non-healing right hallux ulcer with depth\par no change in healing\par no signs of cellulitis right hallux\par +DM with neuropathy\par patient golfing and walking excessively\par erythema and edema noted on legs not connected to right big toe ulcer\par venous insufficiency b/l\par DP 2/4 PT 1/4 both feet 1+ edema both legs\par no need for oral antibiosis\par unknown HGBA1C with WAB=552 mg/dl\par new abrasion/ulcer anterior right shin from cam boot rubbing into leg\par no signs of erythema localized t o anterior right leg\par Patient returned to wearing cam walker right leg since right shin is healed
show

## 2024-10-07 NOTE — PACU DISCHARGE NOTE - NSCLINEINSERTRD_GEN_ALL_CORE
0.1 thou/mm3 Final    Immature Grans (Abs) 10/04/2024 0.01  0.00 - 0.07 thou/mm3 Final    nRBC 10/04/2024 0  /100 wbc Final    [place] [address]    Ethanol Lvl 10/04/2024 0.02  0.00 % [place] [address]    Albumin 10/04/2024 4.0  3.5 - 5.1 g/dL Final    Total Bilirubin 10/04/2024 0.7  0.3 - 1.2 mg/dL Final    Bilirubin, Direct 10/04/2024 <0.1  0.1 - 13.8 mg/dL Final    Alkaline Phosphatase 10/04/2024 81  38 - 126 U/L Final    AST 10/04/2024 31  5 - 40 U/L Final    ALT 10/04/2024 19  11 - 66 U/L Final    Total Protein 10/04/2024 7.6  6.1 - 8.0 g/dL Final    [place] [address]    Salicylate Lvl 10/04/2024 < 0.3 (L)  2.0 - 10.0 mg/dL Final    [place] [address]    TSH 10/04/2024 1.210  0.400 - 4.200 uIU/mL [place] [address]    Anion Gap 10/04/2024 14.0  8.0 - 16.0 meq/L Final    Comment: ANION GAP = Sodium -(Chloride + CO2)  [place] [address]      Osmolality Calc 10/04/2024 272.9 (L)  275.0 - 300.0 mOsmol/kg Final    [place] [address]    Est, Glom Filt Rate 10/04/2024 > 90  >60 ml/min/1.73m2 Final    Comment: Pediatric calculator link  https://www.kidney.org/professionals/kdoqi/gfr_calculatorped  Effective Oct 3, 2022  These results are not intended for use in patients  <18 years of age.  eGFR results are calculated without a race factor  using the 2021 CKD-EPI equation.  Careful clinical  correlation is recommended, particularly when comparing  to results calculated using previous equations. The  CKD-EPI equation is less accurate in patients with  extremes of muscle mass, extra-renal metabolism of  creatinine, excessive creatine ingestion, or  technology.**   No

## 2025-05-29 ENCOUNTER — NON-APPOINTMENT (OUTPATIENT)
Age: 65
End: 2025-05-29

## 2025-05-29 ENCOUNTER — APPOINTMENT (OUTPATIENT)
Age: 65
End: 2025-05-29
Payer: MEDICARE

## 2025-05-29 PROCEDURE — 92250 FUNDUS PHOTOGRAPHY W/I&R: CPT

## 2025-05-29 PROCEDURE — 92012 INTRM OPH EXAM EST PATIENT: CPT

## 2025-08-06 NOTE — H&P PST ADULT - ALCOHOL USE HISTORY SINGLE SELECT
Copied from CRM #[id number]. Topic: General Inquiry - Patient Advice  >> Aug 6, 2025  2:39 PM Lakshmi wrote:  ..Type:  Patient Requesting Call    Who Called: Fiona Siegel  What is the call regarding?: pt had lab work done but one of the orders was left out so it was never done. She would like to know if she can have it done at the office.  Would the patient rather a call back or a response via [facility]?  call  Best Call Back Number: [phone] (home)  Additional Information:  
yes...

## (undated) DEVICE — DRAPE MAGNETIC INSTRUMENT MEDIUM

## (undated) DEVICE — DRSG XEROFORM 1 X 8"

## (undated) DEVICE — DRSG STOCKINETTE IMPERVIOUS MED

## (undated) DEVICE — SUT POLYSORB 2-0 30" GS-21 UNDYED

## (undated) DEVICE — DRAPE 3/4 SHEET W REINFORCEMENT 56X77"

## (undated) DEVICE — STRYKER INTERPULSE HANDPIECE W IRR SUCTION TUBE

## (undated) DEVICE — SUT PLAIN GUT 4-0 18" P-12

## (undated) DEVICE — WRIGHT MEDICAL SIZER STAPLE FUSEFORCE

## (undated) DEVICE — DRSG COMBINE 5X9"

## (undated) DEVICE — SOL IRR POUR H2O 250ML

## (undated) DEVICE — MEDICATION LABELS W MARKER

## (undated) DEVICE — SAW BLADE MICROAIRE OSCILATING 25.4MM X 90MM X 1.27MM

## (undated) DEVICE — GLV 8 PROTEXIS (WHITE)

## (undated) DEVICE — DRSG KLING 4"

## (undated) DEVICE — SAW BLADE MICROAIRE SAGITTAL 5.8X25.4X0.6 MM

## (undated) DEVICE — DRAPE 1/2 SHEET 40X57"

## (undated) DEVICE — POSITIONER FOAM EGG CRATE ULNAR 2PCS (PINK)

## (undated) DEVICE — PACK EXTREMITY

## (undated) DEVICE — BLADE SCALPEL SAFETYLOCK #15

## (undated) DEVICE — VENODYNE/SCD SLEEVE CALF LARGE

## (undated) DEVICE — DRAPE INSTRUMENT POUCH 6.75" X 11"

## (undated) DEVICE — DRSG STOCKINETTE IMPERVIOUS XL

## (undated) DEVICE — PACKING GAUZE PLAIN 0.5"

## (undated) DEVICE — DRAPE TOWEL BLUE 17" X 24"

## (undated) DEVICE — DRAPE ISOLATION BAG 20X20"

## (undated) DEVICE — GOWN TRIMAX XXL

## (undated) DEVICE — NDL HYPO REGULAR BEVEL 25G X 1.5" (BLUE)

## (undated) DEVICE — DRSG ACE BANDAGE 6"

## (undated) DEVICE — STAPLER SKIN VISI-STAT 35 WIDE

## (undated) DEVICE — SAW BLADE MICROAIRE SAGITTAL 9.4MMX25.4MMX0.6MM

## (undated) DEVICE — FOLEY TRAY 16FR 5CC LTX UMETER CLOSED

## (undated) DEVICE — PREP BETADINE KIT

## (undated) DEVICE — PACKING GAUZE PLAIN 2"

## (undated) DEVICE — SOL IRR BAG NS 0.9% 3000ML

## (undated) DEVICE — SYR LUER LOK 10CC

## (undated) DEVICE — WARMING BLANKET UPPER ADULT

## (undated) DEVICE — LAP PAD 18 X 18"

## (undated) DEVICE — BUR STRYKER OVAL SOLID CARBIDE MED 4MM

## (undated) DEVICE — DRSG STERISTRIPS 0.5 X 4"

## (undated) DEVICE — MARKING PEN W RULER

## (undated) DEVICE — SPECIMEN CONTAINER 100ML

## (undated) DEVICE — PACKING GAUZE IODOFORM 2"

## (undated) DEVICE — DRAPE LIGHT HANDLE COVER (BLUE)

## (undated) DEVICE — SOL IRR POUR NS 0.9% 500ML